# Patient Record
Sex: FEMALE | Race: WHITE | NOT HISPANIC OR LATINO | Employment: FULL TIME | URBAN - METROPOLITAN AREA
[De-identification: names, ages, dates, MRNs, and addresses within clinical notes are randomized per-mention and may not be internally consistent; named-entity substitution may affect disease eponyms.]

---

## 2024-05-16 ENCOUNTER — TRANSFERRED RECORDS (OUTPATIENT)
Dept: HEALTH INFORMATION MANAGEMENT | Facility: CLINIC | Age: 69
End: 2024-05-16

## 2024-06-04 ENCOUNTER — TRANSCRIBE ORDERS (OUTPATIENT)
Dept: OTHER | Age: 69
End: 2024-06-04

## 2024-06-04 DIAGNOSIS — H53.10 UNSPECIFIED SUBJECTIVE VISUAL DISTURBANCES: Primary | ICD-10-CM

## 2024-06-26 ENCOUNTER — TELEPHONE (OUTPATIENT)
Dept: OPHTHALMOLOGY | Facility: CLINIC | Age: 69
End: 2024-06-26
Payer: COMMERCIAL

## 2024-06-26 NOTE — TELEPHONE ENCOUNTER
Could we please get records? - per Dr. Reeves     Called and spoke to Ana     She will have the records released to us -     Lissa Overton Communication Facilitator on 6/26/2024 at 3:06 PM

## 2024-07-08 NOTE — PROGRESS NOTES
"     Ana Andrade is a 69 year old female with the following diagnoses:   1. Unspecified subjective visual disturbances - Right Eye    2. Myopic degeneration, right - Right Eye    3. Corneal opacification - Left Eye         Patient was sent for consultation by Dr. Gonzalez for evaluation of \"optic nerve compromise.\"    HPI:  69 year old female with history notable for congenital blindness left eye who presents for evaluation of new positive visual phenomenon.     5/14/24 first noticed a blue line with intermittent yellow lines in the right eye only. The line appeared suddenly 1 morning. The line was horizontal and took up about half the visual field in width. It was constant and lasted about 1 week then self resolved. Did not check if the lines went away by closing right eye. No headache. The line did not move. She could see clearly around the line. Unable to see through the line. Line has not recurred. No pain.     Right eye has been only seeing eye for her entire life. Has never had laser or surgery that she knows of.     Her right eye VA is usually 20/200 and VA was tested at 20/200 with Dr. Gonzalez during the visual phenomenon.     Independent historians:  Patient    Review of outside testing:  None available for review       My interpretation performed today of outside testing:  None available for review       Review of outside clinical notes:    5/16/24 -- Visit with Dr. Gonzalez    Past medical history:  Patient Active Problem List   Diagnosis    Obesity, Class I, BMI 30-34.9    Osteopenia         Medications:   No daily medications       Exam:  Visual acuity 20/100 right eye NLP left eye.  Color vision 1/11 right eye and 0/11 left eye.  Intraocular pressure 17 right eye and 13 left eye.  Anterior segment exam with 2+ NS right eye and band K left eye .  Fundus exam with myopic degeneration right eye.  Strabismus exam  with right jerk nystagmus.     Tests ordered and interpreted today:    OCT Optic Nerve " RNFL Spectralis OU (both eyes)          Performed by: EG   . Patient cooperation: Reliable   .   Unable to scan post pole or RNFL due to nystagmus, no view left eye .     Right Eye  Reliability of the test: Poor   . Test Findings: Abnormal   . Test Findings Free Text: Severe myopic degeneration   . Plan: Monitor   .     Left Eye  Reliability of the test: Poor   .              Discussion of management / interpretation with another provider:   Dr. Gonzalez     Assessment/Plan:   It is my impression that patient experienced seeing a blue line in her right eye for approximately 1 week.  She describes that it was difficult to see past the blue line during that time..  She denied any pain to either touch or move the eye.  She states that it came on suddenly upon awakening and then it seemed to go away quickly.  It is unclear whether she experienced any optic nerve dysfunction or not.  She has longstanding poor vision from myopic degeneration.  It would be unusual for an optic neuropathy to present with positive visual phenomenon like this.  She is on no retina toxic medications.  It is possible that she had an atypical migraine.  She endorses zigzag lines that last hours at a time occurring intermittently.  Is also possible that she was experiencing the hallucination of Dc Bonnet syndrome given the poor vision in the right eye.  The only optic nerve issue that I can think of that could occur at this time would be idiopathic optic neuritis versus MOG optic neuritis.  It is too late to order an MRI since her symptoms began almost 2 months ago.  Offered to check for MOG vs observation and imaging/labs if symptoms recur. She elected to observe.  She will let me know if she has a recurrence of symptoms.            Attending Physician Attestation:  Complete documentation of historical and exam elements from today's encounter can be found in the full encounter summary report (not reduplicated in this progress note).  I  personally obtained the chief complaint(s) and history of present illness.  I confirmed and edited as necessary the review of systems, past medical/surgical history, family history, social history, and examination findings as documented by others; and I examined the patient myself.  I personally reviewed the relevant tests, images, and reports as documented above.  I formulated and edited as necessary the assessment and plan and discussed the findings and management plan with the patient and family. I personally reviewed the ophthalmic test(s) associated with this encounter, agree with the interpretation(s) as documented by the resident/fellow, and have edited the corresponding report(s) as necessary.  - Blake Arevalo MD  PGY-3 Ophthalmology

## 2024-07-09 ENCOUNTER — OFFICE VISIT (OUTPATIENT)
Dept: OPHTHALMOLOGY | Facility: CLINIC | Age: 69
End: 2024-07-09
Attending: OPHTHALMOLOGY
Payer: COMMERCIAL

## 2024-07-09 DIAGNOSIS — H53.40 VISUAL FIELD DEFECT: Primary | ICD-10-CM

## 2024-07-09 DIAGNOSIS — H44.21 MYOPIC DEGENERATION, RIGHT: ICD-10-CM

## 2024-07-09 DIAGNOSIS — H17.9 CORNEAL OPACIFICATION: ICD-10-CM

## 2024-07-09 DIAGNOSIS — H53.10 UNSPECIFIED SUBJECTIVE VISUAL DISTURBANCES: Primary | ICD-10-CM

## 2024-07-09 PROBLEM — M85.80 OSTEOPENIA: Status: ACTIVE | Noted: 2020-12-28

## 2024-07-09 PROBLEM — E66.811 OBESITY, CLASS I, BMI 30-34.9: Status: ACTIVE | Noted: 2020-08-17

## 2024-07-09 PROCEDURE — 99214 OFFICE O/P EST MOD 30 MIN: CPT | Performed by: OPHTHALMOLOGY

## 2024-07-09 PROCEDURE — 92004 COMPRE OPH EXAM NEW PT 1/>: CPT | Mod: GC | Performed by: OPHTHALMOLOGY

## 2024-07-09 PROCEDURE — 92133 CPTRZD OPH DX IMG PST SGM ON: CPT | Performed by: OPHTHALMOLOGY

## 2024-07-09 ASSESSMENT — VISUAL ACUITY
OD_CC+: -1
OD_CC: 20/100
CORRECTION_TYPE: GLASSES
METHOD: SNELLEN - LINEAR
OS_CC: NLP

## 2024-07-09 ASSESSMENT — CONF VISUAL FIELD
OS_INFERIOR_NASAL_RESTRICTION: 1
OD_SUPERIOR_NASAL_RESTRICTION: 0
OD_INFERIOR_TEMPORAL_RESTRICTION: 0
OS_SUPERIOR_TEMPORAL_RESTRICTION: 1
METHOD: COUNTING FINGERS
OS_INFERIOR_TEMPORAL_RESTRICTION: 1
OS_SUPERIOR_NASAL_RESTRICTION: 1
OD_SUPERIOR_TEMPORAL_RESTRICTION: 0
OD_NORMAL: 1
OD_INFERIOR_NASAL_RESTRICTION: 0

## 2024-07-09 ASSESSMENT — REFRACTION_WEARINGRX
OD_ADD: +2.00
OS_SPHERE: BALANCE
OD_CYLINDER: SPHERE
OD_SPHERE: -10.50
OS_ADD: +2.00

## 2024-07-09 ASSESSMENT — TONOMETRY
IOP_METHOD: ICARE
OD_IOP_MMHG: 17
OS_IOP_MMHG: 13

## 2024-07-09 ASSESSMENT — SLIT LAMP EXAM - LIDS: COMMENTS: NORMAL

## 2024-07-09 ASSESSMENT — EXTERNAL EXAM - LEFT EYE: OS_EXAM: NORMAL

## 2024-07-09 ASSESSMENT — EXTERNAL EXAM - RIGHT EYE: OD_EXAM: NORMAL

## 2024-07-09 NOTE — NURSING NOTE
Chief Complaint(s) and History of Present Illness(es)       Vision Changes Ou    In right eye.  Associated symptoms include Negative for eye pain, headache, flashes and floaters. Additional comments: Ana Andrade is a 69 year old female sent for consultation by Dr. Gonzalez for visual disturbances.  Ana says she saw Dr. Gonzalez back in May 2024 for colored lines in her vision.  This went on for several weeks, no longer having them.  She reports no vision in left eye, has been this way since birth.  No eye pain or discomfort.  ANDREW Tong 7/9/2024 9:33 AM

## 2024-07-09 NOTE — LETTER
"2024     RE:  :  MRN: Ana Andrade  1955  [id number]     Dear Dr. Gonzalez    Thank you for asking me to see your very pleasant patient,Ana Andrade, in neuro-ophthalmic consultation.  I would like to thank you for sending your records and I have summarized them in the history of present illness.   My assessment and plan are below.  For further details, please see my attached clinic note.      Ana Andrade is a 69 year old female with the following diagnoses:   1. Unspecified subjective visual disturbances - Right Eye    2. Myopic degeneration, right - Right Eye    3. Corneal opacification - Left Eye         Patient was sent for consultation by Dr. Gonzalez for evaluation of \"optic nerve compromise.\"    HPI:  69 year old female with history notable for congenital blindness left eye who presents for evaluation of new positive visual phenomenon.     24 first noticed a blue line with intermittent yellow lines in the right eye only. The line appeared suddenly 1 morning. The line was horizontal and took up about half the visual field in width. It was constant and lasted about 1 week then self resolved. Did not check if the lines went away by closing right eye. No headache. The line did not move. She could see clearly around the line. Unable to see through the line. Line has not recurred. No pain.     Right eye has been only seeing eye for her entire life. Has never had laser or surgery that she knows of.     Her right eye VA is usually 20/200 and VA was tested at 20/200 with Dr. Gonzalez during the visual phenomenon.     Independent historians: Patient    Review of outside testing: None available for review     My interpretation performed today of outside testing: None available for review       Review of outside clinical notes:    24 -- Visit with Dr. Gonzalez    Past medical history:  Patient Active Problem List   Diagnosis    Obesity, Class I, BMI 30-34.9    Osteopenia "     Medications:  No daily medications     Exam: Visual acuity 20/100 right eye NLP left eye.  Color vision 1/11 right eye and 0/11 left eye.  Intraocular pressure 17 right eye and 13 left eye.  Anterior segment exam with 2+ NS right eye and band K left eye .  Fundus exam with myopic degeneration right eye.  Strabismus exam  with right jerk nystagmus.     Tests ordered and interpreted today:    OCT Optic Nerve RNFL Spectralis OU (both eyes)    Performed by: EG   . Patient cooperation: Reliable   .   Unable to scan post pole or RNFL due to nystagmus, no view left eye .     Right Eye  Reliability of the test: Poor   . Test Findings: Abnormal   . Test Findings Free Text: Severe myopic degeneration   . Plan: Monitor   .     Left Eye  Reliability of the test: Poor     Discussion of management / interpretation with another provider:   Dr. Gonzalez     Assessment/Plan:   It is my impression that patient experienced seeing a blue line in her right eye for approximately 1 week.  She describes that it was difficult to see past the blue line during that time..  She denied any pain to either touch or move the eye.  She states that it came on suddenly upon awakening and then it seemed to go away quickly.  It is unclear whether she experienced any optic nerve dysfunction or not.  She has longstanding poor vision from myopic degeneration.  It would be unusual for an optic neuropathy to present with positive visual phenomenon like this.  She is on no retina toxic medications.  It is possible that she had an atypical migraine.  She endorses zigzag lines that last hours at a time occurring intermittently.  Is also possible that she was experiencing the hallucination of Dc Bonnet syndrome given the poor vision in the right eye.  The only optic nerve issue that I can think of that could occur at this time would be idiopathic optic neuritis versus MOG optic neuritis.  It is too late to order an MRI since her symptoms began almost 2  months ago.  Offered to check for MOG vs observation and imaging/labs if symptoms recur. She elected to observe.  She will let me know if she has a recurrence of symptoms.    Again, thank you for allowing me to participate in the care of your patient.      Sincerely,    Blake Reeves MD  Professor  Director of Neuro-Ophthalmology  [facility] [facility] [employer] Endowed Chair  Departments of Ophthalmology, Neurology, and Neurosurgery  [address]  [address]  T - [phone]  F - [phone]  [email or web address] [email or web address]      CC: Liz Gonzalez  [address]  [facility] [address]  Via Fax: [phone]